# Patient Record
Sex: MALE | Race: BLACK OR AFRICAN AMERICAN | Employment: OTHER | URBAN - METROPOLITAN AREA
[De-identification: names, ages, dates, MRNs, and addresses within clinical notes are randomized per-mention and may not be internally consistent; named-entity substitution may affect disease eponyms.]

---

## 2024-03-15 ENCOUNTER — APPOINTMENT (OUTPATIENT)
Dept: GENERAL RADIOLOGY | Age: 44
DRG: 770 | End: 2024-03-15
Payer: MEDICAID

## 2024-03-15 ENCOUNTER — HOSPITAL ENCOUNTER (INPATIENT)
Age: 44
LOS: 2 days | Discharge: LEFT BEFORE DISCHARGE | DRG: 770 | End: 2024-03-17
Attending: EMERGENCY MEDICINE | Admitting: STUDENT IN AN ORGANIZED HEALTH CARE EDUCATION/TRAINING PROGRAM
Payer: MEDICAID

## 2024-03-15 DIAGNOSIS — R07.9 CHEST PAIN, UNSPECIFIED TYPE: ICD-10-CM

## 2024-03-15 DIAGNOSIS — F10.931 ACUTE HYPERACTIVE ALCOHOL WITHDRAWAL DELIRIUM (HCC): Primary | ICD-10-CM

## 2024-03-15 PROBLEM — F10.139 ALCOHOL ABUSE WITH WITHDRAWAL (HCC): Status: ACTIVE | Noted: 2024-03-15

## 2024-03-15 LAB
ALBUMIN SERPL-MCNC: 4.3 GM/DL (ref 3.4–5)
ALCOHOL SCREEN SERUM: 0.09 %WT/VOL
ALP BLD-CCNC: 53 IU/L (ref 40–128)
ALT SERPL-CCNC: 87 U/L (ref 10–40)
AMPHETAMINES: NEGATIVE
ANION GAP SERPL CALCULATED.3IONS-SCNC: 15 MMOL/L (ref 7–16)
AST SERPL-CCNC: 72 IU/L (ref 15–37)
BACTERIA: NEGATIVE /HPF
BARBITURATE SCREEN URINE: ABNORMAL
BASOPHILS ABSOLUTE: 0 K/CU MM
BASOPHILS RELATIVE PERCENT: 0.3 % (ref 0–1)
BENZODIAZEPINE SCREEN, URINE: NEGATIVE
BILIRUB SERPL-MCNC: 0.3 MG/DL (ref 0–1)
BILIRUBIN URINE: NEGATIVE MG/DL
BLOOD, URINE: NEGATIVE
BUN SERPL-MCNC: 11 MG/DL (ref 6–23)
CALCIUM SERPL-MCNC: 8.8 MG/DL (ref 8.3–10.6)
CANNABINOID SCREEN URINE: ABNORMAL
CAST TYPE: NORMAL /HPF
CHLORIDE BLD-SCNC: 100 MMOL/L (ref 99–110)
CLARITY: CLEAR
CO2: 22 MMOL/L (ref 21–32)
COCAINE METABOLITE: ABNORMAL
COLOR: YELLOW
COMMENT UA: NORMAL
CREAT SERPL-MCNC: 0.7 MG/DL (ref 0.9–1.3)
CRYSTAL TYPE: NEGATIVE /HPF
DIFFERENTIAL TYPE: ABNORMAL
EKG ATRIAL RATE: 113 BPM
EKG DIAGNOSIS: NORMAL
EKG P AXIS: 79 DEGREES
EKG P-R INTERVAL: 170 MS
EKG Q-T INTERVAL: 332 MS
EKG QRS DURATION: 90 MS
EKG QTC CALCULATION (BAZETT): 455 MS
EKG R AXIS: 52 DEGREES
EKG T AXIS: 53 DEGREES
EKG VENTRICULAR RATE: 113 BPM
EOSINOPHILS ABSOLUTE: 0 K/CU MM
EOSINOPHILS RELATIVE PERCENT: 0.3 % (ref 0–3)
EPITHELIAL CELLS, UA: 0 /HPF
FENTANYL URINE: NEGATIVE
GFR SERPL CREATININE-BSD FRML MDRD: >60 ML/MIN/1.73M2
GLUCOSE SERPL-MCNC: 92 MG/DL (ref 70–99)
GLUCOSE, URINE: NEGATIVE MG/DL
HCT VFR BLD CALC: 36.3 % (ref 42–52)
HEMOGLOBIN: 11.3 GM/DL (ref 13.5–18)
IMMATURE NEUTROPHIL %: 0.2 % (ref 0–0.43)
KETONES, URINE: ABNORMAL MG/DL
LEUKOCYTE ESTERASE, URINE: NEGATIVE
LYMPHOCYTES ABSOLUTE: 0.7 K/CU MM
LYMPHOCYTES RELATIVE PERCENT: 10.9 % (ref 24–44)
MAGNESIUM: 1.8 MG/DL (ref 1.8–2.4)
MCH RBC QN AUTO: 27.8 PG (ref 27–31)
MCHC RBC AUTO-ENTMCNC: 31.1 % (ref 32–36)
MCV RBC AUTO: 89.2 FL (ref 78–100)
MONOCYTES ABSOLUTE: 1.1 K/CU MM
MONOCYTES RELATIVE PERCENT: 16.8 % (ref 0–4)
NITRITE URINE, QUANTITATIVE: NEGATIVE
NUCLEATED RBC %: 0 %
OPIATES, URINE: NEGATIVE
OXYCODONE: NEGATIVE
PDW BLD-RTO: 14.7 % (ref 11.7–14.9)
PH, URINE: 5.5 (ref 5–8)
PLATELET # BLD: 156 K/CU MM (ref 140–440)
PMV BLD AUTO: 9.8 FL (ref 7.5–11.1)
POTASSIUM SERPL-SCNC: 3.4 MMOL/L (ref 3.5–5.1)
PROTEIN UA: ABNORMAL MG/DL
RBC # BLD: 4.07 M/CU MM (ref 4.6–6.2)
RBC URINE: 0 /HPF (ref 0–3)
SEGMENTED NEUTROPHILS ABSOLUTE COUNT: 4.7 K/CU MM
SEGMENTED NEUTROPHILS RELATIVE PERCENT: 71.5 % (ref 36–66)
SODIUM BLD-SCNC: 137 MMOL/L (ref 135–145)
SPECIFIC GRAVITY UA: 1.02 (ref 1–1.03)
TOTAL IMMATURE NEUTOROPHIL: 0.01 K/CU MM
TOTAL NUCLEATED RBC: 0 K/CU MM
TOTAL PROTEIN: 7.8 GM/DL (ref 6.4–8.2)
TROPONIN, HIGH SENSITIVITY: 11 NG/L (ref 0–22)
TROPONIN, HIGH SENSITIVITY: 12 NG/L (ref 0–22)
UROBILINOGEN, URINE: 1 MG/DL (ref 0.2–1)
WBC # BLD: 6.6 K/CU MM (ref 4–10.5)
WBC UA: <1 /HPF (ref 0–2)

## 2024-03-15 PROCEDURE — 84484 ASSAY OF TROPONIN QUANT: CPT

## 2024-03-15 PROCEDURE — 71045 X-RAY EXAM CHEST 1 VIEW: CPT

## 2024-03-15 PROCEDURE — 6370000000 HC RX 637 (ALT 250 FOR IP): Performed by: FAMILY MEDICINE

## 2024-03-15 PROCEDURE — 2140000000 HC CCU INTERMEDIATE R&B

## 2024-03-15 PROCEDURE — 81001 URINALYSIS AUTO W/SCOPE: CPT

## 2024-03-15 PROCEDURE — 2580000003 HC RX 258: Performed by: EMERGENCY MEDICINE

## 2024-03-15 PROCEDURE — 80307 DRUG TEST PRSMV CHEM ANLYZR: CPT

## 2024-03-15 PROCEDURE — 6370000000 HC RX 637 (ALT 250 FOR IP): Performed by: NURSE PRACTITIONER

## 2024-03-15 PROCEDURE — 83735 ASSAY OF MAGNESIUM: CPT

## 2024-03-15 PROCEDURE — 93010 ELECTROCARDIOGRAM REPORT: CPT | Performed by: INTERNAL MEDICINE

## 2024-03-15 PROCEDURE — 94761 N-INVAS EAR/PLS OXIMETRY MLT: CPT

## 2024-03-15 PROCEDURE — 6360000002 HC RX W HCPCS: Performed by: NURSE PRACTITIONER

## 2024-03-15 PROCEDURE — 80053 COMPREHEN METABOLIC PANEL: CPT

## 2024-03-15 PROCEDURE — 96365 THER/PROPH/DIAG IV INF INIT: CPT

## 2024-03-15 PROCEDURE — 2580000003 HC RX 258: Performed by: FAMILY MEDICINE

## 2024-03-15 PROCEDURE — 99285 EMERGENCY DEPT VISIT HI MDM: CPT

## 2024-03-15 PROCEDURE — C9113 INJ PANTOPRAZOLE SODIUM, VIA: HCPCS | Performed by: STUDENT IN AN ORGANIZED HEALTH CARE EDUCATION/TRAINING PROGRAM

## 2024-03-15 PROCEDURE — G0480 DRUG TEST DEF 1-7 CLASSES: HCPCS

## 2024-03-15 PROCEDURE — 6370000000 HC RX 637 (ALT 250 FOR IP): Performed by: STUDENT IN AN ORGANIZED HEALTH CARE EDUCATION/TRAINING PROGRAM

## 2024-03-15 PROCEDURE — 6360000002 HC RX W HCPCS: Performed by: EMERGENCY MEDICINE

## 2024-03-15 PROCEDURE — 93005 ELECTROCARDIOGRAM TRACING: CPT | Performed by: EMERGENCY MEDICINE

## 2024-03-15 PROCEDURE — 6360000002 HC RX W HCPCS: Performed by: FAMILY MEDICINE

## 2024-03-15 PROCEDURE — 6360000002 HC RX W HCPCS: Performed by: STUDENT IN AN ORGANIZED HEALTH CARE EDUCATION/TRAINING PROGRAM

## 2024-03-15 PROCEDURE — 85025 COMPLETE CBC W/AUTO DIFF WBC: CPT

## 2024-03-15 RX ORDER — PHENOBARBITAL 32.4 MG/1
32.4 TABLET ORAL 2 TIMES DAILY
Status: COMPLETED | OUTPATIENT
Start: 2024-03-16 | End: 2024-03-17

## 2024-03-15 RX ORDER — ONDANSETRON 2 MG/ML
4 INJECTION INTRAMUSCULAR; INTRAVENOUS EVERY 6 HOURS PRN
Status: DISCONTINUED | OUTPATIENT
Start: 2024-03-15 | End: 2024-03-17 | Stop reason: HOSPADM

## 2024-03-15 RX ORDER — AMLODIPINE BESYLATE 10 MG/1
10 TABLET ORAL DAILY
COMMUNITY

## 2024-03-15 RX ORDER — PHENOBARBITAL SODIUM 65 MG/ML
65 INJECTION, SOLUTION INTRAMUSCULAR; INTRAVENOUS EVERY 6 HOURS PRN
Status: DISCONTINUED | OUTPATIENT
Start: 2024-03-15 | End: 2024-03-17 | Stop reason: HOSPADM

## 2024-03-15 RX ORDER — ONDANSETRON 4 MG/1
4 TABLET, ORALLY DISINTEGRATING ORAL EVERY 8 HOURS PRN
Status: DISCONTINUED | OUTPATIENT
Start: 2024-03-15 | End: 2024-03-17 | Stop reason: HOSPADM

## 2024-03-15 RX ORDER — TRAZODONE HYDROCHLORIDE 50 MG/1
50 TABLET ORAL NIGHTLY PRN
Status: DISCONTINUED | OUTPATIENT
Start: 2024-03-15 | End: 2024-03-17 | Stop reason: HOSPADM

## 2024-03-15 RX ORDER — ENOXAPARIN SODIUM 100 MG/ML
40 INJECTION SUBCUTANEOUS DAILY
Status: DISCONTINUED | OUTPATIENT
Start: 2024-03-15 | End: 2024-03-17 | Stop reason: HOSPADM

## 2024-03-15 RX ORDER — SODIUM CHLORIDE 0.9 % (FLUSH) 0.9 %
5-40 SYRINGE (ML) INJECTION EVERY 12 HOURS SCHEDULED
Status: DISCONTINUED | OUTPATIENT
Start: 2024-03-15 | End: 2024-03-17 | Stop reason: HOSPADM

## 2024-03-15 RX ORDER — SODIUM CHLORIDE 9 MG/ML
INJECTION, SOLUTION INTRAVENOUS PRN
Status: DISCONTINUED | OUTPATIENT
Start: 2024-03-15 | End: 2024-03-15 | Stop reason: SDUPTHER

## 2024-03-15 RX ORDER — LOSARTAN POTASSIUM 100 MG/1
100 TABLET ORAL DAILY
Status: DISCONTINUED | OUTPATIENT
Start: 2024-03-15 | End: 2024-03-17 | Stop reason: HOSPADM

## 2024-03-15 RX ORDER — LOSARTAN POTASSIUM 100 MG/1
100 TABLET ORAL DAILY
COMMUNITY

## 2024-03-15 RX ORDER — PHENOBARBITAL 32.4 MG/1
64.8 TABLET ORAL 2 TIMES DAILY
Status: COMPLETED | OUTPATIENT
Start: 2024-03-15 | End: 2024-03-16

## 2024-03-15 RX ORDER — SODIUM CHLORIDE 0.9 % (FLUSH) 0.9 %
5-40 SYRINGE (ML) INJECTION PRN
Status: DISCONTINUED | OUTPATIENT
Start: 2024-03-15 | End: 2024-03-17 | Stop reason: HOSPADM

## 2024-03-15 RX ORDER — POTASSIUM CHLORIDE 7.45 MG/ML
10 INJECTION INTRAVENOUS
Status: COMPLETED | OUTPATIENT
Start: 2024-03-15 | End: 2024-03-15

## 2024-03-15 RX ORDER — AMLODIPINE BESYLATE 10 MG/1
10 TABLET ORAL DAILY
Status: DISCONTINUED | OUTPATIENT
Start: 2024-03-15 | End: 2024-03-17 | Stop reason: HOSPADM

## 2024-03-15 RX ORDER — PHENOBARBITAL 32.4 MG/1
32.4 TABLET ORAL 2 TIMES DAILY
Status: DISCONTINUED | OUTPATIENT
Start: 2024-03-17 | End: 2024-03-15

## 2024-03-15 RX ORDER — PHENOBARBITAL 32.4 MG/1
32.4 TABLET ORAL 2 TIMES DAILY
Status: DISCONTINUED | OUTPATIENT
Start: 2024-03-18 | End: 2024-03-15

## 2024-03-15 RX ORDER — LANOLIN ALCOHOL/MO/W.PET/CERES
100 CREAM (GRAM) TOPICAL DAILY
Status: DISCONTINUED | OUTPATIENT
Start: 2024-03-15 | End: 2024-03-17 | Stop reason: HOSPADM

## 2024-03-15 RX ORDER — PHENOBARBITAL 32.4 MG/1
32.4 TABLET ORAL DAILY
Status: DISCONTINUED | OUTPATIENT
Start: 2024-03-18 | End: 2024-03-15

## 2024-03-15 RX ORDER — PHENOBARBITAL 32.4 MG/1
64.8 TABLET ORAL 2 TIMES DAILY
Status: DISCONTINUED | OUTPATIENT
Start: 2024-03-16 | End: 2024-03-15

## 2024-03-15 RX ORDER — PANTOPRAZOLE SODIUM 40 MG/10ML
40 INJECTION, POWDER, LYOPHILIZED, FOR SOLUTION INTRAVENOUS DAILY
Status: DISCONTINUED | OUTPATIENT
Start: 2024-03-15 | End: 2024-03-17 | Stop reason: HOSPADM

## 2024-03-15 RX ORDER — PHENOBARBITAL 32.4 MG/1
32.4 TABLET ORAL 2 TIMES DAILY
Status: DISCONTINUED | OUTPATIENT
Start: 2024-03-17 | End: 2024-03-17 | Stop reason: HOSPADM

## 2024-03-15 RX ORDER — SODIUM CHLORIDE 9 MG/ML
INJECTION, SOLUTION INTRAVENOUS CONTINUOUS
Status: DISPENSED | OUTPATIENT
Start: 2024-03-15 | End: 2024-03-15

## 2024-03-15 RX ORDER — PHENOBARBITAL SODIUM 65 MG/ML
130 INJECTION, SOLUTION INTRAMUSCULAR; INTRAVENOUS
Status: DISCONTINUED | OUTPATIENT
Start: 2024-03-15 | End: 2024-03-15

## 2024-03-15 RX ORDER — SODIUM CHLORIDE 9 MG/ML
INJECTION, SOLUTION INTRAVENOUS PRN
Status: DISCONTINUED | OUTPATIENT
Start: 2024-03-15 | End: 2024-03-17 | Stop reason: HOSPADM

## 2024-03-15 RX ORDER — 0.9 % SODIUM CHLORIDE 0.9 %
1000 INTRAVENOUS SOLUTION INTRAVENOUS ONCE
Status: COMPLETED | OUTPATIENT
Start: 2024-03-15 | End: 2024-03-15

## 2024-03-15 RX ORDER — M-VIT,TX,IRON,MINS/CALC/FOLIC 27MG-0.4MG
1 TABLET ORAL DAILY
Status: DISCONTINUED | OUTPATIENT
Start: 2024-03-15 | End: 2024-03-17 | Stop reason: HOSPADM

## 2024-03-15 RX ADMIN — ONDANSETRON 4 MG: 2 INJECTION INTRAMUSCULAR; INTRAVENOUS at 20:39

## 2024-03-15 RX ADMIN — AMLODIPINE BESYLATE 10 MG: 10 TABLET ORAL at 18:50

## 2024-03-15 RX ADMIN — SODIUM CHLORIDE: 9 INJECTION, SOLUTION INTRAVENOUS at 09:30

## 2024-03-15 RX ADMIN — SODIUM CHLORIDE 1000 ML: 9 INJECTION, SOLUTION INTRAVENOUS at 04:45

## 2024-03-15 RX ADMIN — POTASSIUM CHLORIDE 10 MEQ: 7.46 INJECTION, SOLUTION INTRAVENOUS at 07:47

## 2024-03-15 RX ADMIN — LOSARTAN POTASSIUM 100 MG: 100 TABLET, FILM COATED ORAL at 18:50

## 2024-03-15 RX ADMIN — Medication 1 TABLET: at 07:48

## 2024-03-15 RX ADMIN — Medication 1 LOZENGE: at 10:08

## 2024-03-15 RX ADMIN — ONDANSETRON 4 MG: 4 TABLET, ORALLY DISINTEGRATING ORAL at 07:54

## 2024-03-15 RX ADMIN — PHENOBARBITAL SODIUM 780 MG: 130 INJECTION INTRAMUSCULAR; INTRAVENOUS at 04:45

## 2024-03-15 RX ADMIN — Medication 1 LOZENGE: at 18:50

## 2024-03-15 RX ADMIN — PANTOPRAZOLE SODIUM 40 MG: 40 INJECTION, POWDER, FOR SOLUTION INTRAVENOUS at 07:50

## 2024-03-15 RX ADMIN — Medication 100 MG: at 07:49

## 2024-03-15 RX ADMIN — PHENOBARBITAL 64.8 MG: 32.4 TABLET ORAL at 20:41

## 2024-03-15 RX ADMIN — POTASSIUM CHLORIDE 10 MEQ: 7.46 INJECTION, SOLUTION INTRAVENOUS at 06:35

## 2024-03-15 RX ADMIN — PHENOBARBITAL SODIUM 65 MG: 65 INJECTION INTRAMUSCULAR; INTRAVENOUS at 20:38

## 2024-03-15 RX ADMIN — Medication 1 LOZENGE: at 12:58

## 2024-03-15 RX ADMIN — PHENOBARBITAL SODIUM 130 MG: 65 INJECTION INTRAMUSCULAR; INTRAVENOUS at 12:01

## 2024-03-15 ASSESSMENT — PAIN DESCRIPTION - LOCATION
LOCATION: CHEST
LOCATION: OTHER (COMMENT)

## 2024-03-15 ASSESSMENT — PAIN - FUNCTIONAL ASSESSMENT
PAIN_FUNCTIONAL_ASSESSMENT: ACTIVITIES ARE NOT PREVENTED
PAIN_FUNCTIONAL_ASSESSMENT: 0-10

## 2024-03-15 ASSESSMENT — PAIN SCALES - GENERAL
PAINLEVEL_OUTOF10: 9
PAINLEVEL_OUTOF10: 6
PAINLEVEL_OUTOF10: 3

## 2024-03-15 ASSESSMENT — PAIN DESCRIPTION - DESCRIPTORS: DESCRIPTORS: BURNING

## 2024-03-15 NOTE — ED NOTES
ED TO INPATIENT SBAR HANDOFF    Patient Name: Dannie Houston   :  1980  43 y.o.   Preferred Name    Family/Caregiver Present no   Restraints no   C-SSRS: Risk of Suicide: No Risk  Sitter no   Sepsis Risk Score Sepsis Risk Score: 1.74      Situation  Chief Complaint   Patient presents with    Chest Pain     Woke him up out of sleep, about 45 minutes ago Hx of chest pain, Hx of Hypertension, emotional stress, ASA 324mg, refused nitro     Brief Description of Patient's Condition: Pt presented to ED with c/o chest pain. Pt states it woke him up. Has hx of chest pain, hypertension, anxiety. Pt has hx alcohol abuse and withdrawal.   Mental Status: oriented, alert, coherent, logical, thought processes intact, and able to concentrate and follow conversation  Arrived from: home    Imaging:   XR CHEST PORTABLE   Final Result      No acute disease      Electronically signed by David Yanez MD        Abnormal labs:   Abnormal Labs Reviewed   COMPREHENSIVE METABOLIC PANEL W/ REFLEX TO MG FOR LOW K - Abnormal; Notable for the following components:       Result Value    Potassium 3.4 (*)     Creatinine 0.7 (*)     ALT 87 (*)     AST 72 (*)     All other components within normal limits   CBC WITH AUTO DIFFERENTIAL - Abnormal; Notable for the following components:    RBC 4.07 (*)     Hemoglobin 11.3 (*)     Hematocrit 36.3 (*)     MCHC 31.1 (*)     Segs Relative 71.5 (*)     Lymphocytes % 10.9 (*)     Monocytes % 16.8 (*)     All other components within normal limits       Background  History:   Past Medical History:   Diagnosis Date    Alcohol abuse     states he drinks heavily when he has anxiety    Anxiety     Hypertension        Assessment    Vitals: MEWS Score: 2  Level of Consciousness: Alert (0)   Vitals:    03/15/24 0451 03/15/24 0502 03/15/24 0532 03/15/24 0533   BP:  (!) 170/100 (!) 167/85    Pulse: 97 (!) 114 (!) 120 (!) 107   Resp: 18 23 (!) 31 29   Temp:       SpO2: 96% 98% 93% 92%   Weight:       Height:

## 2024-03-15 NOTE — ED NOTES
ED TO INPATIENT SBAR HANDOFF    Patient Name: Dannie Houston   :  1980  43 y.o.   Preferred Name  Fuad  Family/Caregiver Present no   Restraints no   C-SSRS: Risk of Suicide: No Risk  Sitter no   Sepsis Risk Score Sepsis Risk Score: 0.47      Situation  Chief Complaint   Patient presents with    Chest Pain     Woke him up out of sleep, about 45 minutes ago Hx of chest pain, Hx of Hypertension, emotional stress, ASA 324mg, refused nitro     Brief Description of Patient's Condition: Pt is a/o from [facility] ith c/o chest pain and alcohol withdraw. Pt had phenobarbital this morning. Pt also had an IV push at 1201. Pts last CIWA was  16. Urine shala screen showed cannabinoid, cocaine, and barbiturates. Alcohol was .09. Troponin 11  Mental Status: alert  Arrived from: home    Imaging:   XR CHEST PORTABLE   Final Result      No acute disease      Electronically signed by David Yanez MD        Abnormal labs:   Abnormal Labs Reviewed   COMPREHENSIVE METABOLIC PANEL W/ REFLEX TO MG FOR LOW K - Abnormal; Notable for the following components:       Result Value    Potassium 3.4 (*)     Creatinine 0.7 (*)     ALT 87 (*)     AST 72 (*)     All other components within normal limits   CBC WITH AUTO DIFFERENTIAL - Abnormal; Notable for the following components:    RBC 4.07 (*)     Hemoglobin 11.3 (*)     Hematocrit 36.3 (*)     MCHC 31.1 (*)     Segs Relative 71.5 (*)     Lymphocytes % 10.9 (*)     Monocytes % 16.8 (*)     All other components within normal limits   ETHANOL - Abnormal; Notable for the following components:    Alcohol Scrn 0.09 (*)     All other components within normal limits   URINE DRUG SCREEN - Abnormal; Notable for the following components:    Cannabinoid Scrn, Ur UNCONFIRMED POSITIVE (*)     Cocaine Metabolite UNCONFIRMED POSITIVE (*)     Barbiturate Screen, Ur UNCONFIRMED POSITIVE (*)     All other components within normal limits   URINALYSIS WITH REFLEX TO CULTURE - Abnormal; Notable for the

## 2024-03-15 NOTE — CARE COORDINATION
MCG criteria for Substance related disorder  reviewed at this time, criteria supports Inpatient Admission. YI,RN/CM

## 2024-03-15 NOTE — DISCHARGE INSTRUCTIONS
[id number] [phone]         [facility]    [address] / [address], [place] [address] [phone]     DO Irlanda Landaverde DO Khadija Ahmed, MD    [address], [address], [place] [phone]     DO Bakari Arguello, RICHARD Kwon, APRN-CNP     Jared Garcia APRN-CNP         [address] [facility], [place] [address] [phone]     NAHUN BahC

## 2024-03-15 NOTE — ED NOTES
Pt provided with medication to help with his throat, warm blanket and a pillow. Bed alarm in place for pts safety. Pt a/o and on the monitor.

## 2024-03-15 NOTE — ED PROVIDER NOTES
Triage Chief Complaint:   Chest Pain (Woke him up out of sleep, about 45 minutes ago Hx of chest pain, Hx of Hypertension, emotional stress, ASA 324mg, refused nitro)    [place]:  Dannie Houston is a 43 y.o. male that presents via EMS for chest pain.  States that he woke up prior to arrival with some chest tightness that is now improving but he has associated mild headache, nausea, vomited once, tremors, anxiety, pins-and-needles in his fingers and toes, spots in his vision.  States that he has been drinking heavily for the past few weeks, up to a liter of hard alcohol daily and his last drink was 2 days ago.  States that he got a ride here to do some work from [place] and has not been able to get any alcohol.  States that he has been in withdrawal in the past and this feels similar.  No reported fever, cough, difficulty breathing or abdominal pain.    ROS:  At least 10 systems reviewed and otherwise acutely negative except as in the [place].    Past Medical History:   Diagnosis Date    Alcohol abuse     states he drinks heavily when he has anxiety    Anxiety     Hypertension      History reviewed. No pertinent surgical history.  History reviewed. No pertinent family history.  Social History     Socioeconomic History    Marital status: Single     Spouse name: Not on file    Number of children: Not on file    Years of education: Not on file    Highest education level: Not on file   Occupational History    Not on file   Tobacco Use    Smoking status: Never    Smokeless tobacco: Never   Substance and Sexual Activity    Alcohol use: Yes    Drug use: Defer    Sexual activity: Yes     Partners: Female   Other Topics Concern    Not on file   Social History Narrative    Not on file     Social Determinants of Health     Financial Resource Strain: Not on file   Food Insecurity: Not on file   Transportation Needs: Not on file   Physical Activity: Not on file   Stress: Not on file   Social Connections: Not on file   Intimate Partner

## 2024-03-15 NOTE — ED NOTES
Pt feeling anxious and requesting something else for his alcohol withdraw.  aware and checking with pharmacy about giving an additional dose of phenobarbital.

## 2024-03-15 NOTE — PROCEDURES
V2.0    [facility] [place]      Name:  Dannie Houston /Age/Sex: 1980  (43 y.o. male)   MRN & CSN:  [id number] & [id number] Encounter Date/Time: 3/15/2024 2:36 PM EDT   Location:  ED26/ED-26 PCP: No primary care provider on file.     Attending:Nitin Crowder MD       Hospital Day: 1    Assessment and Recommendations   Dannie Houston is a 43 y.o. male with pmh of Alcoholism, HTN, anxiety who presents with Alcohol abuse with withdrawal (HCC)      Plan:   Alcohol abuse with acute withdrawal: CIWA up to 23 in the emergency room and drinks hard liquor daily with last drink 2 days prior to arrival.  EtOH level 0.09.  Will continue phenobarbital taper with as needed phenobarbital.  Continue vitamin supplementation.  Counseled on cessation.  Hypokalemia: Mild and supplemented the emergency room.  Will monitor and provide additional supplementation as needed.  Elevated transaminases: In the setting of alcohol use, ALT 87 and AST 72.  Alcohol cessation.  Chest discomfort: None present on 3/15 but apparently on admission.  Had some tachycardia suspect in the setting of alcohol withdrawal.  EKG without evidence of acute ischemia.  Troponin 11.  Resolved.  Essential hypertension: Continue home Norvasc and losartan.  Will titrate as needed.      Diet ADULT DIET; Regular   DVT Prophylaxis [x] Lovenox, []  Heparin, [] SCDs, [] Ambulation,  [] Eliquis, [] Xarelto  [] Coumadin   Code Status Full Code   Disposition From: Home  Expected Disposition: Home  Estimated Date of Discharge: 2-3 days  Patient requires continued admission due to alcohol withdrawal on phenobarbital taper   Surrogate Decision Maker/ POA       Personally reviewed Lab Studies and Imaging       EKG interpreted personally and results sinus tachycardia    Imaging that was interpreted personally includes chest xray and results no acute findings        Subjective:     Chief Complaint: alcohol withdrawal    Dannie Houston is a 43 y.o. male who presents

## 2024-03-15 NOTE — H&P
History and Physical      Name:  Dannie Houston /Age/Sex: 1980  (43 y.o. male)   MRN & CSN:  [id number] & [id number] Encounter Date/Time: 3/15/2024 5:38 AM EDT   Location:  ED26/ED-26 PCP: No primary care provider on file.       Assessment and Plan:   Dannie Houston is a 43 y.o. male with hypertension, alcohol abuse presented from home with chest pain, palpitations and tremors.    Alcohol abuse with withdrawal syndrome  Drinks about 1 L of hard liquor per day, last drink was 2 days ago  EtOH level 0.09  Phenobarbital taper  Thiamine and multivitamin  Check urine drug screen  Social work consult for IP rehab evaluation    Hypokalemia  IV replacement, recheck in a.m.    Alcoholic hepatitis      Noncardiac chest pain, resolved  Likely related to tachycardia and palpitations due to alcohol withdrawal  EKG personally reviewed sinus tachycardia no acute ST-T wave abnormalities QTc 455 ms.  Chest x-ray reviewed, no acute abnormalities  Troponin 12  Recheck troponin    Hypertension, elevated blood pressure trend  Secondary to autonomic hyperreactivity due to alcohol withdrawal  Management as above  Reinstitute home medications gradually    Inpatient, MedSurg  Full code    Disposition:     Current Living situation: Home  Expected Disposition: Home  Estimated D/C: 2 days    Diet ADULT DIET; Regular   DVT Prophylaxis [x] Lovenox, []  Heparin, [] SCDs, [] Ambulation,  [] Eliquis, [] Xarelto, [] Coumadin   Code Status Full Code   Surrogate Decision Maker/ POA None provided     Personally reviewed Lab Studies and Imaging   Discussed management of the case with ER provider who recommended admission for alcohol withdrawal.      History from:     Patient   EMR    History of Present Illness:     Chief Complaint: Chest pain palpitations tremors    Dannie Houston is a 43 y.o. male with hypertension, alcohol abuse presented from home with chest pain, palpitations and tremors.  Patient reports that he woke up prior to

## 2024-03-16 LAB
ALBUMIN SERPL-MCNC: 4.1 GM/DL (ref 3.4–5)
ALP BLD-CCNC: 53 IU/L (ref 40–128)
ALT SERPL-CCNC: 53 U/L (ref 10–40)
ANION GAP SERPL CALCULATED.3IONS-SCNC: 11 MMOL/L (ref 7–16)
AST SERPL-CCNC: 40 IU/L (ref 15–37)
B PARAP IS1001 DNA NPH QL NAA+NON-PROBE: NOT DETECTED
B PERT.PT PRMT NPH QL NAA+NON-PROBE: NOT DETECTED
BASOPHILS ABSOLUTE: 0 K/CU MM
BASOPHILS RELATIVE PERCENT: 0.5 % (ref 0–1)
BILIRUB SERPL-MCNC: 0.4 MG/DL (ref 0–1)
BUN SERPL-MCNC: 9 MG/DL (ref 6–23)
C PNEUM DNA NPH QL NAA+NON-PROBE: NOT DETECTED
CALCIUM SERPL-MCNC: 8.7 MG/DL (ref 8.3–10.6)
CHLORIDE BLD-SCNC: 100 MMOL/L (ref 99–110)
CO2: 26 MMOL/L (ref 21–32)
CREAT SERPL-MCNC: 0.7 MG/DL (ref 0.9–1.3)
DIFFERENTIAL TYPE: ABNORMAL
EOSINOPHILS ABSOLUTE: 0.1 K/CU MM
EOSINOPHILS RELATIVE PERCENT: 1 % (ref 0–3)
FLUAV H1 2009 PAN RNA NPH NAA+NON-PROBE: NOT DETECTED
FLUAV H1 RNA NPH QL NAA+NON-PROBE: NOT DETECTED
FLUAV H3 RNA NPH QL NAA+NON-PROBE: NOT DETECTED
FLUAV RNA NPH QL NAA+NON-PROBE: NOT DETECTED
FLUBV RNA NPH QL NAA+NON-PROBE: NOT DETECTED
GFR SERPL CREATININE-BSD FRML MDRD: >60 ML/MIN/1.73M2
GLUCOSE SERPL-MCNC: 100 MG/DL (ref 70–99)
HADV DNA NPH QL NAA+NON-PROBE: NOT DETECTED
HCOV 229E RNA NPH QL NAA+NON-PROBE: NOT DETECTED
HCOV HKU1 RNA NPH QL NAA+NON-PROBE: NOT DETECTED
HCOV NL63 RNA NPH QL NAA+NON-PROBE: NOT DETECTED
HCOV OC43 RNA NPH QL NAA+NON-PROBE: NOT DETECTED
HCT VFR BLD CALC: 37 % (ref 42–52)
HEMOGLOBIN: 11.5 GM/DL (ref 13.5–18)
HMPV RNA NPH QL NAA+NON-PROBE: NOT DETECTED
HPIV1 RNA NPH QL NAA+NON-PROBE: NOT DETECTED
HPIV2 RNA NPH QL NAA+NON-PROBE: NOT DETECTED
HPIV3 RNA NPH QL NAA+NON-PROBE: NOT DETECTED
HPIV4 RNA NPH QL NAA+NON-PROBE: NOT DETECTED
IMMATURE NEUTROPHIL %: 0.3 % (ref 0–0.43)
LYMPHOCYTES ABSOLUTE: 1.3 K/CU MM
LYMPHOCYTES RELATIVE PERCENT: 21.4 % (ref 24–44)
M PNEUMO DNA NPH QL NAA+NON-PROBE: NOT DETECTED
MCH RBC QN AUTO: 27.8 PG (ref 27–31)
MCHC RBC AUTO-ENTMCNC: 31.1 % (ref 32–36)
MCV RBC AUTO: 89.4 FL (ref 78–100)
MONOCYTES ABSOLUTE: 0.9 K/CU MM
MONOCYTES RELATIVE PERCENT: 15.6 % (ref 0–4)
NUCLEATED RBC %: 0 %
PDW BLD-RTO: 14.2 % (ref 11.7–14.9)
PLATELET # BLD: 175 K/CU MM (ref 140–440)
PMV BLD AUTO: 9.5 FL (ref 7.5–11.1)
POTASSIUM SERPL-SCNC: 3.9 MMOL/L (ref 3.5–5.1)
RBC # BLD: 4.14 M/CU MM (ref 4.6–6.2)
REASON FOR REJECTION: NORMAL
REJECTED TEST: NORMAL
RSV RNA NPH QL NAA+NON-PROBE: NOT DETECTED
RV+EV RNA NPH QL NAA+NON-PROBE: NOT DETECTED
SARS-COV-2 RNA NPH QL NAA+NON-PROBE: NOT DETECTED
SEGMENTED NEUTROPHILS ABSOLUTE COUNT: 3.7 K/CU MM
SEGMENTED NEUTROPHILS RELATIVE PERCENT: 61.2 % (ref 36–66)
SODIUM BLD-SCNC: 137 MMOL/L (ref 135–145)
TOTAL IMMATURE NEUTOROPHIL: 0.02 K/CU MM
TOTAL NUCLEATED RBC: 0 K/CU MM
TOTAL PROTEIN: 6.9 GM/DL (ref 6.4–8.2)
WBC # BLD: 6 K/CU MM (ref 4–10.5)

## 2024-03-16 PROCEDURE — 6360000002 HC RX W HCPCS: Performed by: FAMILY MEDICINE

## 2024-03-16 PROCEDURE — 0202U NFCT DS 22 TRGT SARS-COV-2: CPT

## 2024-03-16 PROCEDURE — 6370000000 HC RX 637 (ALT 250 FOR IP): Performed by: FAMILY MEDICINE

## 2024-03-16 PROCEDURE — 80053 COMPREHEN METABOLIC PANEL: CPT

## 2024-03-16 PROCEDURE — 2140000000 HC CCU INTERMEDIATE R&B

## 2024-03-16 PROCEDURE — 6360000002 HC RX W HCPCS: Performed by: NURSE PRACTITIONER

## 2024-03-16 PROCEDURE — 2580000003 HC RX 258: Performed by: FAMILY MEDICINE

## 2024-03-16 PROCEDURE — 85025 COMPLETE CBC W/AUTO DIFF WBC: CPT

## 2024-03-16 PROCEDURE — 6370000000 HC RX 637 (ALT 250 FOR IP): Performed by: STUDENT IN AN ORGANIZED HEALTH CARE EDUCATION/TRAINING PROGRAM

## 2024-03-16 PROCEDURE — 6370000000 HC RX 637 (ALT 250 FOR IP): Performed by: NURSE PRACTITIONER

## 2024-03-16 PROCEDURE — 36415 COLL VENOUS BLD VENIPUNCTURE: CPT

## 2024-03-16 PROCEDURE — 6360000002 HC RX W HCPCS: Performed by: STUDENT IN AN ORGANIZED HEALTH CARE EDUCATION/TRAINING PROGRAM

## 2024-03-16 PROCEDURE — 94761 N-INVAS EAR/PLS OXIMETRY MLT: CPT

## 2024-03-16 PROCEDURE — C9113 INJ PANTOPRAZOLE SODIUM, VIA: HCPCS | Performed by: FAMILY MEDICINE

## 2024-03-16 RX ORDER — BENZONATATE 100 MG/1
100 CAPSULE ORAL 3 TIMES DAILY PRN
Status: DISCONTINUED | OUTPATIENT
Start: 2024-03-16 | End: 2024-03-17 | Stop reason: HOSPADM

## 2024-03-16 RX ORDER — GUAIFENESIN/DEXTROMETHORPHAN 100-10MG/5
5 SYRUP ORAL EVERY 4 HOURS PRN
Status: DISCONTINUED | OUTPATIENT
Start: 2024-03-16 | End: 2024-03-17 | Stop reason: HOSPADM

## 2024-03-16 RX ORDER — DIPHENHYDRAMINE HYDROCHLORIDE 50 MG/ML
25 INJECTION INTRAMUSCULAR; INTRAVENOUS EVERY 6 HOURS PRN
Status: DISCONTINUED | OUTPATIENT
Start: 2024-03-16 | End: 2024-03-17

## 2024-03-16 RX ADMIN — Medication 1 LOZENGE: at 21:47

## 2024-03-16 RX ADMIN — PHENOBARBITAL 64.8 MG: 32.4 TABLET ORAL at 08:11

## 2024-03-16 RX ADMIN — GUAIFENESIN AND DEXTROMETHORPHAN 5 ML: 100; 10 SYRUP ORAL at 17:29

## 2024-03-16 RX ADMIN — PHENOBARBITAL SODIUM 65 MG: 65 INJECTION INTRAMUSCULAR; INTRAVENOUS at 08:21

## 2024-03-16 RX ADMIN — DIPHENHYDRAMINE HYDROCHLORIDE 25 MG: 50 INJECTION, SOLUTION INTRAMUSCULAR; INTRAVENOUS at 20:32

## 2024-03-16 RX ADMIN — ONDANSETRON 4 MG: 2 INJECTION INTRAMUSCULAR; INTRAVENOUS at 11:18

## 2024-03-16 RX ADMIN — Medication 1 LOZENGE: at 09:15

## 2024-03-16 RX ADMIN — PHENOBARBITAL SODIUM 65 MG: 65 INJECTION INTRAMUSCULAR; INTRAVENOUS at 20:32

## 2024-03-16 RX ADMIN — TRAZODONE HYDROCHLORIDE 50 MG: 50 TABLET ORAL at 00:03

## 2024-03-16 RX ADMIN — DIPHENHYDRAMINE HYDROCHLORIDE 25 MG: 50 INJECTION, SOLUTION INTRAMUSCULAR; INTRAVENOUS at 13:59

## 2024-03-16 RX ADMIN — Medication 1 LOZENGE: at 17:32

## 2024-03-16 RX ADMIN — BENZONATATE 100 MG: 100 CAPSULE ORAL at 12:16

## 2024-03-16 RX ADMIN — Medication 1 TABLET: at 08:12

## 2024-03-16 RX ADMIN — Medication 1 LOZENGE: at 00:03

## 2024-03-16 RX ADMIN — PHENOBARBITAL 32.4 MG: 32.4 TABLET ORAL at 20:32

## 2024-03-16 RX ADMIN — TRAZODONE HYDROCHLORIDE 50 MG: 50 TABLET ORAL at 20:32

## 2024-03-16 RX ADMIN — ENOXAPARIN SODIUM 40 MG: 100 INJECTION SUBCUTANEOUS at 08:12

## 2024-03-16 RX ADMIN — SODIUM CHLORIDE, PRESERVATIVE FREE 10 ML: 5 INJECTION INTRAVENOUS at 08:12

## 2024-03-16 RX ADMIN — SODIUM CHLORIDE, PRESERVATIVE FREE 10 ML: 5 INJECTION INTRAVENOUS at 20:33

## 2024-03-16 RX ADMIN — Medication 100 MG: at 08:12

## 2024-03-16 RX ADMIN — GUAIFENESIN AND DEXTROMETHORPHAN 5 ML: 100; 10 SYRUP ORAL at 12:16

## 2024-03-16 RX ADMIN — PANTOPRAZOLE SODIUM 40 MG: 40 INJECTION, POWDER, FOR SOLUTION INTRAVENOUS at 08:11

## 2024-03-16 RX ADMIN — AMLODIPINE BESYLATE 10 MG: 10 TABLET ORAL at 08:11

## 2024-03-16 RX ADMIN — GUAIFENESIN AND DEXTROMETHORPHAN 5 ML: 100; 10 SYRUP ORAL at 21:47

## 2024-03-16 RX ADMIN — LOSARTAN POTASSIUM 100 MG: 100 TABLET, FILM COATED ORAL at 08:12

## 2024-03-16 ASSESSMENT — PAIN DESCRIPTION - ORIENTATION
ORIENTATION: MID
ORIENTATION: MID

## 2024-03-16 ASSESSMENT — PAIN DESCRIPTION - PAIN TYPE: TYPE: ACUTE PAIN

## 2024-03-16 ASSESSMENT — PAIN SCALES - GENERAL
PAINLEVEL_OUTOF10: 6
PAINLEVEL_OUTOF10: 0
PAINLEVEL_OUTOF10: 4

## 2024-03-16 ASSESSMENT — PAIN DESCRIPTION - LOCATION
LOCATION: CHEST
LOCATION: CHEST

## 2024-03-16 ASSESSMENT — PAIN DESCRIPTION - DESCRIPTORS
DESCRIPTORS: TIGHTNESS
DESCRIPTORS: TIGHTNESS

## 2024-03-16 NOTE — PLAN OF CARE
Problem: Safety - Adult  Goal: Free from fall injury  3/16/2024 1005 by Toshia Jacobsen RN  Outcome: Progressing  3/15/2024 2211 by Ava Villanueva RN  Outcome: Progressing     Problem: Discharge Planning  Goal: Discharge to home or other facility with appropriate resources  3/16/2024 1005 by Toshia Jacobsen RN  Outcome: Progressing  3/15/2024 2211 by Ava Villanueva RN  Outcome: Progressing     Problem: Pain  Goal: Verbalizes/displays adequate comfort level or baseline comfort level  Outcome: Progressing     Problem: ABCDS Injury Assessment  Goal: Absence of physical injury  Outcome: Progressing

## 2024-03-17 VITALS
TEMPERATURE: 97.9 F | HEART RATE: 87 BPM | RESPIRATION RATE: 17 BRPM | WEIGHT: 239 LBS | BODY MASS INDEX: 29.72 KG/M2 | SYSTOLIC BLOOD PRESSURE: 136 MMHG | OXYGEN SATURATION: 98 % | DIASTOLIC BLOOD PRESSURE: 87 MMHG | HEIGHT: 75 IN

## 2024-03-17 PROCEDURE — 6370000000 HC RX 637 (ALT 250 FOR IP): Performed by: STUDENT IN AN ORGANIZED HEALTH CARE EDUCATION/TRAINING PROGRAM

## 2024-03-17 PROCEDURE — 94761 N-INVAS EAR/PLS OXIMETRY MLT: CPT

## 2024-03-17 PROCEDURE — C9113 INJ PANTOPRAZOLE SODIUM, VIA: HCPCS | Performed by: FAMILY MEDICINE

## 2024-03-17 PROCEDURE — 6360000002 HC RX W HCPCS: Performed by: FAMILY MEDICINE

## 2024-03-17 PROCEDURE — 6370000000 HC RX 637 (ALT 250 FOR IP): Performed by: FAMILY MEDICINE

## 2024-03-17 PROCEDURE — 6370000000 HC RX 637 (ALT 250 FOR IP): Performed by: NURSE PRACTITIONER

## 2024-03-17 PROCEDURE — 6360000002 HC RX W HCPCS: Performed by: STUDENT IN AN ORGANIZED HEALTH CARE EDUCATION/TRAINING PROGRAM

## 2024-03-17 RX ORDER — DIPHENHYDRAMINE HCL 25 MG
25 TABLET ORAL
Status: COMPLETED | OUTPATIENT
Start: 2024-03-17 | End: 2024-03-17

## 2024-03-17 RX ORDER — LORAZEPAM 0.5 MG/1
0.5 TABLET ORAL
Status: DISCONTINUED | OUTPATIENT
Start: 2024-03-17 | End: 2024-03-17

## 2024-03-17 RX ADMIN — LOSARTAN POTASSIUM 100 MG: 100 TABLET, FILM COATED ORAL at 09:23

## 2024-03-17 RX ADMIN — Medication 1 TABLET: at 09:22

## 2024-03-17 RX ADMIN — PANTOPRAZOLE SODIUM 40 MG: 40 INJECTION, POWDER, FOR SOLUTION INTRAVENOUS at 09:22

## 2024-03-17 RX ADMIN — GUAIFENESIN AND DEXTROMETHORPHAN 5 ML: 100; 10 SYRUP ORAL at 10:52

## 2024-03-17 RX ADMIN — PHENOBARBITAL 32.4 MG: 32.4 TABLET ORAL at 09:22

## 2024-03-17 RX ADMIN — Medication 100 MG: at 09:23

## 2024-03-17 RX ADMIN — AMLODIPINE BESYLATE 10 MG: 10 TABLET ORAL at 09:23

## 2024-03-17 RX ADMIN — DIPHENHYDRAMINE HYDROCHLORIDE 25 MG: 50 INJECTION, SOLUTION INTRAMUSCULAR; INTRAVENOUS at 05:41

## 2024-03-17 RX ADMIN — DIPHENHYDRAMINE HYDROCHLORIDE 25 MG: 25 TABLET ORAL at 14:07

## 2024-03-17 RX ADMIN — Medication 1 LOZENGE: at 10:51

## 2024-03-17 ASSESSMENT — PAIN SCALES - GENERAL: PAINLEVEL_OUTOF10: 0

## 2024-03-17 NOTE — PROGRESS NOTES
V2.0    [facility] [place]      Name:  Dannie Houston /Age/Sex: 1980  (43 y.o. male)   MRN & CSN:  [id number] & [id number] Encounter Date/Time: 3/17/2024 8:02 AM EDT   Location:  [address] [place] [address]-A PCP: No primary care provider on file.     Attending:Matt Lawton MD       Hospital Day: 3    Assessment and Recommendations   Dannie Houston is a 43 y.o. male with pmh of ETOH use, anxiety, HTN who presents with Alcohol abuse with withdrawal (HCC)    Past Medical History:   Diagnosis Date    Alcohol abuse     states he drinks heavily when he has anxiety    Anxiety     Hypertension          Plan:     Alcohol abuse with withdrawal syndrome  Drinks about 1 L of hard liquor per day, last drink was 2 days PRIOR TO PRESENTATION- 3/13 OR 3/15  EtOH level 0.09  Phenobarbital taper  Thiamine and multivitamin  Check urine drug screen  Social work consult for IP rehab evaluation     Hypokalemia  IV replacement, recheck in a.m.     Alcoholic hepatitis  Monitor CMP     Noncardiac chest pain, resolved  Likely related to tachycardia and palpitations due to alcohol withdrawal  EKG personally reviewed sinus tachycardia no acute ST-T wave abnormalities QTc 455 ms.  Chest x-ray reviewed, no acute abnormalities  Troponin 12  Recheck troponin     Hypertension, elevated blood pressure trend  Secondary to autonomic hyperreactivity due to alcohol withdrawal  Management as above  Reinstitute home medications gradually      Pt thinking about leaving Against medical Advice. Pt states his ride I ncoming to take him to [place]. Pt explained importance of his stay and risks associated with leaving AMA. Pt AAOx3, understands reason for admission and risks associated with leaving against medical advice including but not limited to . Pt denies SI/ HI/ depression symptoms and thinking about leaving hospital. Counselled on the imprortance of care plan and risks associated with leavign against medical advice. Pt appears to be having caacity per my 
1730 Received from Ed on cart pt stood and placed self on bed. Gait unsteady. Pt can't keep eyes open long enough to answer admission questions. No distress note. Oriented to room and bed alarm Call light within reach side rails up x3. Pads for side rails will be place momentarily. Wheels of bed locked bed in lowest position iv infusing in right arm per pump with out difficulty. Turned self on to left side. Monitor in place no oxygen. 4 eyes completed. Will open eyes answer questions then goes back to snoring . Unable to get admission information at this time d/t this.   1855 Pt awake now ordered evening meal requested the urinal. Cozar given and norvasc per order for B/p  165/103  
4 Eyes Skin Assessment     NAME:  Dannie Houston  YOB: 1980  MEDICAL RECORD NUMBER:  [id number]    The patient is being assessed for  Admission    I agree that at least one RN has performed a thorough Head to Toe Skin Assessment on the patient. ALL assessment sites listed below have been assessed.      Areas assessed by both nurses:    Head, Face, Ears, Shoulders, Back, Chest, Arms, Elbows, Hands, Sacrum. Buttock, Coccyx, Ischium, Legs. Feet and Heels, and Under Medical Devices         Does the Patient have a Wound? No noted wound(s)       Michael Prevention initiated by RN: No  Wound Care Orders initiated by RN: No    Pressure Injury (Stage 3,4, Unstageable, DTI, NWPT, and Complex wounds) if present, place Wound referral order by RN under : No    New Ostomies, if present place, Ostomy referral order under : No     Nurse 1 eSignature: {Esignature:[id number]}    **SHARE this note so that the co-signing nurse can place an eSignature**    Nurse 2 eSignature: Electronically signed by Loren Lance RN on 3/16/24 at 12:08 PM EDT   
EDVIN Barnes NP notified of CIWA score upon receiving Pt and assessment at bedside report. Pt has visible tremors, sweating and piloerection while laying in bed. Stating he doesn't feel well, c/o pain and mild nausea.   
Patient states he would like to leave AMA as his ride back to [place], [place] leaves today and he has no other way home. Patient is agreeable to sign out and go home to [place] to be readmitted to a hospital close to home. Patient agreeable to sign AMA form and IV access removed. Dr. Lawton updated.  
03/16/24 0400   BP:  (!) 138/108 (!) 139/96 122/78   Pulse: 98 (!) 102 98 93   Resp:  25 19 17   Temp:  99.6 °F (37.6 °C) 99.5 °F (37.5 °C) 99.9 °F (37.7 °C)   TempSrc:  Oral Oral Oral   SpO2:    97%   Weight:       Height:             Physical Exam:      General: NAD  Eyes: EOMI  ENT: neck supple  Cardiovascular: Regular rate.  Respiratory: Clear to auscultation  Gastrointestinal: Soft, non tender  Genitourinary: no suprapubic tenderness  Musculoskeletal: No edema  Skin: warm, dry  Neuro: Alert.  Psych: Mood anxious        Medications:   Medications:    sodium chloride flush  5-40 mL IntraVENous 2 times per day    thiamine  100 mg Oral Daily    enoxaparin  40 mg SubCUTAneous Daily    multivitamin  1 tablet Oral Daily    pantoprazole  40 mg IntraVENous Daily    amLODIPine  10 mg Oral Daily    losartan  100 mg Oral Daily    PHENobarbital  64.8 mg Oral BID    Followed by    PHENobarbital  32.4 mg Oral BID    Followed by    [START ON 3/17/2024] PHENobarbital  32.4 mg Oral BID      Infusions:    sodium chloride       PRN Meds: sodium chloride flush, 5-40 mL, PRN  ondansetron, 4 mg, Q8H PRN   Or  ondansetron, 4 mg, Q6H PRN  traZODone, 50 mg, Nightly PRN  Benzocaine-Menthol, 1 lozenge, Q2H PRN  sodium chloride, , PRN  PHENobarbital, 65 mg, Q6H PRN        Labs and Imaging   XR CHEST PORTABLE    Result Date: 3/15/2024  AP CHEST HISTORY: Chest pain COMPARISON None FINDINGS: The heart size is within normal limits. Lungs appear clear of acute disease.     No acute disease Electronically signed by David Yanez MD      CBC:   Recent Labs     03/15/24  0439   WBC 6.6   HGB 11.3*        BMP:    Recent Labs     03/15/24  0439      K 3.4*      CO2 22   BUN 11   CREATININE 0.7*   GLUCOSE 92     Hepatic:   Recent Labs     03/15/24  0439   AST 72*   ALT 87*   BILITOT 0.3   ALKPHOS 53     Lipids: No results found for: \"CHOL\", \"HDL\", \"TRIG\"  Hemoglobin A1C: No results found for: \"LABA1C\"  TSH: No results found for:

## 2024-03-17 NOTE — DISCHARGE SUMMARY
V2.0  Discharge Summary    Name:  Dannie Houston /Age/Sex: 1980 (43 y.o. male)   Admit Date: 3/15/2024  Discharge Date: 3/17/24    MRN & CSN:  [id number] & [id number] Encounter Date and Time 3/17/24 2:56 PM EDT    Attending:  Matt Lawton MD Discharging Provider: Matt Lawton MD       Hospital Course:     Brief HPI:As per admitting, \" Dannie Houston is a 43 y.o. male with hypertension, alcohol abuse presented from home with chest pain, palpitations and tremors.  Patient reports that he woke up prior to arrival with sharp midsternal chest pain nonradiating associated with headache tremors paresthesias in his bilateral upper and lower extremities.  Patient reports that he has been drinking heavily for the past few weeks last drink was 2 days ago as he was unable to purchase any.  During my evaluation in ER although patient was alert oriented x 3, somnolent and minimal participation in encounter hence most of the history was obtained from EMR.  He does mentions that his chest pain has resolved.  Initial CIWA was 24, given phenobarbital loading dose in ER with reduction in CIWA to 7. \"    Patient was being treated for etoh withdrawal w phenobarbital taper- last CIWA was 7 when patient decided to leave [place] as his ride was taking him to [place].    Alcohol abuse with withdrawal syndrome  Drinks about 1 L of hard liquor per day, last drink was 2 days PRIOR TO PRESENTATION- 3/13 OR 3/15  EtOH level 0.09  Phenobarbital taper  Thiamine and multivitamin  Check urine drug screen  Social work consult for IP rehab evaluation     Hypokalemia  IV replacement, recheck in a.m.     Alcoholic hepatitis  Monitor CMP     Noncardiac chest pain, resolved  Likely related to tachycardia and palpitations due to alcohol withdrawal  EKG personally reviewed sinus tachycardia no acute ST-T wave abnormalities QTc 455 ms.  Chest x-ray reviewed, no acute abnormalities  Troponin 12  Recheck troponin     Hypertension, elevated blood

## 2024-03-17 NOTE — CARE COORDINATION
CM received consult for alcohol and substance use resources. CM reviewed chart. PCP list and drug and alcohol resources left on dc ins. CM is available for any further dc needs.